# Patient Record
Sex: MALE | Race: WHITE | NOT HISPANIC OR LATINO | Employment: OTHER | URBAN - METROPOLITAN AREA
[De-identification: names, ages, dates, MRNs, and addresses within clinical notes are randomized per-mention and may not be internally consistent; named-entity substitution may affect disease eponyms.]

---

## 2022-02-24 ENCOUNTER — HOSPITAL ENCOUNTER (EMERGENCY)
Facility: HOSPITAL | Age: 67
Discharge: HOME | End: 2022-02-24
Attending: EMERGENCY MEDICINE
Payer: MEDICARE

## 2022-02-24 VITALS
HEART RATE: 86 BPM | OXYGEN SATURATION: 97 % | BODY MASS INDEX: 29.76 KG/M2 | DIASTOLIC BLOOD PRESSURE: 91 MMHG | TEMPERATURE: 98 F | RESPIRATION RATE: 18 BRPM | HEIGHT: 66 IN | SYSTOLIC BLOOD PRESSURE: 135 MMHG | WEIGHT: 185.19 LBS

## 2022-02-24 DIAGNOSIS — F32.A DEPRESSION, UNSPECIFIED DEPRESSION TYPE: Primary | ICD-10-CM

## 2022-02-24 PROCEDURE — 99283 EMERGENCY DEPT VISIT LOW MDM: CPT

## 2022-02-24 NOTE — ED PROVIDER NOTES
CHIEF COMPLAINT    Chief Complaint   Patient presents with    Depression     Patient stated he took Ambien to sleep and drank some ETOH. Patient stated he was pulled over by law enforcement       HPI    Sarabjit Goff is a 66 y.o. male who presents after being pulled over by police tonight.  He says that he and his wife have been having some marital problems.  He had taken 2 Ambien and had a couple shots of Tequila earlier and could not sleep and just wanted to get out of the house.  He said he was driving very slow in his car around the neighborhood and police pulled him over and noticed that he was drowsy and told him that could either come to the ER for evaluation or go to skilled nursing.  Patient chose to come to the ER.  He voices no other significant complaints besides chronic depression.  Denies any suicidal or homicidal thoughts.. The severity of the symptoms is moderate.    CURRENT MEDICATIONS    Prior to Admission medications    Not on File       ALLERGIES    Review of patient's allergies indicates:   Allergen Reactions    Pcn [penicillins]     Sulfa (sulfonamide antibiotics) Hives       PAST MEDICAL HISTORY  Past Medical History:   Diagnosis Date    COPD (chronic obstructive pulmonary disease)     Depression     Hypertension     Hypoglycemia, unspecified     Kidney failure        SURGICAL HISTORY    Past Surgical History:   Procedure Laterality Date    PENILE PROSTHESIS IMPLANT N/A     ROTATOR CUFF REPAIR Right     TONSILLECTOMY         SOCIAL HISTORY    Social History     Socioeconomic History    Marital status: Single       FAMILY HISTORY    History reviewed. No pertinent family history.    REVIEW OF SYSTEMS    Constitutional:  No reported fever, chills, weight loss or weakness.   Eyes:  No reported photophobia or discharge. No visual changes  HENT:  No reported sore throat or ear pain.   Respiratory:  No reported cough or shortness of breath.   Cardiovascular:  No reported chest pain, palpitations  "or swelling.   GI:  No reported abdominal pain, nausea, vomiting, or diarrhea.   Musculoskeletal:  No reported back pain.   Skin:  No reported rash.   Neurologic:  No reported headache, focal weakness or sensory changes.   Endocrine:  No reported polyuria or polydypsia.   Lymphatic:  No reported swollen glands.   Psychiatric:  No reported suicidal ideation or homicidal ideation.   All Systems otherwise negative except as noted in the Review of Systems and History of Present Illness.    PHYSICAL EXAM    VITAL SIGNS: /88 (BP Location: Right arm, Patient Position: Sitting)   Pulse 94   Temp 97.6 °F (36.4 °C) (Oral)   Resp 16   Ht 5' 5.5" (1.664 m)   Wt 84 kg (185 lb 3 oz)   SpO2 96%   BMI 30.35 kg/m²    Constitutional:  Well developed, Well nourished who appears stated age, No acute distress, Non-toxic appearance.   Respiratory: Breath sounds clear to auscultation bilaterally, No respiratory distress, No wheezing, No chest tenderness.   Cardiovascular:  Normal heart rate, Normal rhythm, No murmurs, No rubs, No gallops.   Musculoskeletal:  Intact distal pulses, No edema, No cyanosis, No clubbing. Good range of motion in all major joints. No major deformities noted. No tenderness to palpation.  Back- No midline tenderness, step offs or deformities.    Integument:  Warm, Dry, No erythema, No rash.   Lymphatic:  No lymphadenopathy noted.   Neurologic:  Alert & oriented x 3, Motor and sensory function grossly intact, CN 2-12 grossly intact. No focal deficits noted.   Psychiatric:  Affect normal, Judgment normal, Mood normal.     LABS  Pertinent labs reviewed. (See chart for details)   Labs Reviewed - No data to display    RADIOLOGY    Imaging Results    None       ED COURSE & MEDICAL DECISION MAKING    Medications - No data to display    The patient presents with the history as noted above. The differential diagnosis would include but is not limited to:  Depression, suicidal thoughts, alcohol intoxication, " intentional overdose.    Patient presents as above.  He is little drowsy but otherwise alert and oriented with no other specific complaints.  Complains of depression but no suicidal ideation or plan.  Patient requesting to go home and has no other complaints.  He will be discharged home when he has a sober ride home since he is still drowsy from the Ambien.  ED return precautions given the patient.    FINAL IMPRESSION    1. Depression, unspecified depression type          Dariel Gruber    The patient was discharged to home with the following prescriptions:   New Prescriptions    No medications on file       I stressed the importance of close follow-up with:  [facility]  [address] [place] [id number] [phone]    As needed, If symptoms worsen    Primary care provider    In 3 days        I discussed the differential diagnosis, treatment plan, and strict return precautions for any worsening symptoms or new concerning symptoms, and they stated understanding.        **Parts of this note were created using [employer] Voice Recognition software program. While efforts were made to correct any mistakes made by this voice recognition software program, nonsensical phrases may remain in this note.       Dariel Gruber, DO  02/24/22 [id number]

## 2025-05-28 ENCOUNTER — HOSPITAL ENCOUNTER (EMERGENCY)
Facility: HOSPITAL | Age: 70
Discharge: HOME | End: 2025-05-28
Attending: EMERGENCY MEDICINE
Payer: MEDICARE

## 2025-05-28 VITALS
DIASTOLIC BLOOD PRESSURE: 64 MMHG | SYSTOLIC BLOOD PRESSURE: 118 MMHG | WEIGHT: 165 LBS | TEMPERATURE: 98 F | BODY MASS INDEX: 26.52 KG/M2 | RESPIRATION RATE: 18 BRPM | HEART RATE: 84 BPM | HEIGHT: 66 IN | OXYGEN SATURATION: 98 %
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
## 2025-05-28 DIAGNOSIS — M25.511 ACUTE PAIN OF RIGHT SHOULDER: ICD-10-CM

## 2025-05-28 DIAGNOSIS — M25.561 ACUTE PAIN OF RIGHT KNEE: ICD-10-CM

## 2025-05-28 PROCEDURE — 99284 EMERGENCY DEPT VISIT MOD MDM: CPT | Mod: 25

## 2025-05-28 PROCEDURE — 73562 X-RAY EXAM OF KNEE 3: CPT | Mod: TC,RT

## 2025-05-28 PROCEDURE — 63600175 PHARM REV CODE 636 W HCPCS: Mod: JZ,TB

## 2025-05-28 PROCEDURE — 96372 THER/PROPH/DIAG INJ SC/IM: CPT

## 2025-05-28 PROCEDURE — 73030 X-RAY EXAM OF SHOULDER: CPT | Mod: TC,RT

## 2025-05-28 PROCEDURE — 73562 X-RAY EXAM OF KNEE 3: CPT | Mod: 26,RT,, | Performed by: RADIOLOGY

## 2025-05-28 PROCEDURE — 73030 X-RAY EXAM OF SHOULDER: CPT | Mod: 26,RT,, | Performed by: RADIOLOGY

## 2025-05-28 RX ORDER — OXYCODONE HYDROCHLORIDE 15 MG/1
15 TABLET ORAL EVERY 8 HOURS PRN
COMMUNITY
Start: 2025-05-20

## 2025-05-28 RX ORDER — CELECOXIB 100 MG/1
100 CAPSULE ORAL 2 TIMES DAILY
COMMUNITY

## 2025-05-28 RX ORDER — LISINOPRIL 2.5 MG/1
2.5 TABLET ORAL
COMMUNITY
Start: 2025-03-26

## 2025-05-28 RX ORDER — PREGABALIN 150 MG/1
150 CAPSULE ORAL 2 TIMES DAILY
COMMUNITY
Start: 2025-01-16

## 2025-05-28 RX ORDER — FLUOXETINE 20 MG/1
20 CAPSULE ORAL
COMMUNITY
Start: 2025-05-22

## 2025-05-28 RX ORDER — KETOROLAC TROMETHAMINE 30 MG/ML
15 INJECTION, SOLUTION INTRAMUSCULAR; INTRAVENOUS
Status: COMPLETED | OUTPATIENT
Start: 2025-05-28 | End: 2025-05-28

## 2025-05-28 RX ORDER — SEMAGLUTIDE 0.68 MG/ML
0.5 INJECTION, SOLUTION SUBCUTANEOUS
COMMUNITY

## 2025-05-28 RX ORDER — TRAZODONE HYDROCHLORIDE 50 MG/1
50 TABLET ORAL EVERY 6 HOURS
COMMUNITY
Start: 2025-05-20

## 2025-05-28 RX ORDER — TRAMADOL HYDROCHLORIDE 50 MG/1
TABLET, FILM COATED ORAL
COMMUNITY
Start: 2025-03-25

## 2025-05-28 RX ORDER — BUSPIRONE HYDROCHLORIDE 10 MG/1
10 TABLET ORAL 2 TIMES DAILY
COMMUNITY

## 2025-05-28 RX ORDER — TESTOSTERONE CYPIONATE 200 MG/ML
INJECTION, SOLUTION INTRAMUSCULAR
COMMUNITY
Start: 2025-05-05

## 2025-05-28 RX ORDER — LORATADINE 10 MG/1
10 TABLET ORAL DAILY PRN
COMMUNITY
Start: 2025-02-10

## 2025-05-28 RX ORDER — OMEPRAZOLE 20 MG/1
CAPSULE, DELAYED RELEASE ORAL
COMMUNITY
Start: 2025-04-26

## 2025-05-28 RX ORDER — METFORMIN HYDROCHLORIDE 500 MG/1
500 TABLET, EXTENDED RELEASE ORAL
COMMUNITY
Start: 2025-03-26

## 2025-05-28 RX ORDER — ROPINIROLE 0.5 MG/1
0.5 TABLET, FILM COATED ORAL NIGHTLY
COMMUNITY
Start: 2025-05-20

## 2025-05-28 RX ORDER — ATORVASTATIN CALCIUM 40 MG/1
TABLET, FILM COATED ORAL
COMMUNITY
Start: 2021-12-12

## 2025-05-28 RX ORDER — LEVOTHYROXINE SODIUM 100 UG/1
100 TABLET ORAL EVERY MORNING
COMMUNITY

## 2025-05-28 RX ORDER — IPRATROPIUM BROMIDE 42 UG/1
SPRAY, METERED NASAL
COMMUNITY
Start: 2024-12-09

## 2025-05-28 RX ORDER — FLUTICASONE PROPIONATE 50 MCG
SPRAY, SUSPENSION (ML) NASAL
COMMUNITY
Start: 2025-02-10

## 2025-05-28 RX ORDER — AMLODIPINE BESYLATE 5 MG/1
5 TABLET ORAL
COMMUNITY

## 2025-05-28 RX ORDER — METAXALONE 800 MG/1
800 TABLET ORAL EVERY 6 HOURS PRN
COMMUNITY
Start: 2024-12-02

## 2025-05-28 RX ORDER — ZOLPIDEM TARTRATE 10 MG/1
10 TABLET ORAL NIGHTLY PRN
COMMUNITY
Start: 2025-05-20

## 2025-05-28 RX ADMIN — KETOROLAC TROMETHAMINE 15 MG: 30 INJECTION, SOLUTION INTRAMUSCULAR; INTRAVENOUS at 12:05
